# Patient Record
Sex: FEMALE
[De-identification: names, ages, dates, MRNs, and addresses within clinical notes are randomized per-mention and may not be internally consistent; named-entity substitution may affect disease eponyms.]

---

## 2017-01-04 ENCOUNTER — APPOINTMENT (OUTPATIENT)
Dept: ORTHOPEDIC SURGERY | Facility: CLINIC | Age: 37
End: 2017-01-04

## 2017-01-17 ENCOUNTER — APPOINTMENT (OUTPATIENT)
Dept: ORTHOPEDIC SURGERY | Facility: CLINIC | Age: 37
End: 2017-01-17

## 2017-02-13 ENCOUNTER — CLINICAL ADVICE (OUTPATIENT)
Age: 37
End: 2017-02-13

## 2020-09-17 ENCOUNTER — APPOINTMENT (OUTPATIENT)
Dept: ORTHOPEDIC SURGERY | Facility: CLINIC | Age: 40
End: 2020-09-17
Payer: COMMERCIAL

## 2020-09-17 VITALS
HEART RATE: 53 BPM | BODY MASS INDEX: 18.4 KG/M2 | HEIGHT: 62 IN | SYSTOLIC BLOOD PRESSURE: 131 MMHG | WEIGHT: 100 LBS | DIASTOLIC BLOOD PRESSURE: 84 MMHG

## 2020-09-17 DIAGNOSIS — Z86.32 PERSONAL HISTORY OF GESTATIONAL DIABETES: ICD-10-CM

## 2020-09-17 DIAGNOSIS — Z86.59 PERSONAL HISTORY OF OTHER MENTAL AND BEHAVIORAL DISORDERS: ICD-10-CM

## 2020-09-17 DIAGNOSIS — M25.552 PAIN IN LEFT HIP: ICD-10-CM

## 2020-09-17 DIAGNOSIS — M25.551 PAIN IN RIGHT HIP: ICD-10-CM

## 2020-09-17 DIAGNOSIS — Z87.891 PERSONAL HISTORY OF NICOTINE DEPENDENCE: ICD-10-CM

## 2020-09-17 DIAGNOSIS — M25.552 PAIN IN RIGHT HIP: ICD-10-CM

## 2020-09-17 DIAGNOSIS — Z78.9 OTHER SPECIFIED HEALTH STATUS: ICD-10-CM

## 2020-09-17 PROCEDURE — 99203 OFFICE O/P NEW LOW 30 MIN: CPT

## 2020-09-17 PROCEDURE — 73521 X-RAY EXAM HIPS BI 2 VIEWS: CPT

## 2020-09-17 NOTE — ASSESSMENT
[FreeTextEntry1] : 40-year-old woman comes in with ongoing recurrent pain along the superior iliac crest and just below it around the gluteal origin consistent with insertional tendinitis.It's more on the LEFT than RIGHT. It is similar in location to when I saw her 3-4 years ago.. It had responded temporarily to the steroid injection in that area.\par I referred her for therapy again. Heat and ice to the affected area. She is to work on posture. She has a scoliosis in the asymmetry may make her more prone to the condition. 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 She was wondering if she could have a labral tear but her pain is really not in the Groin or hip joint area.\par I did refer her for an MRI of the pelvis to evaluate further. I will call her with the results. Followup in 6-8 weeks. If pain is ongoing we may try an injection again assuming the MRI didn't show any alternate etiology or pathology.

## 2020-09-17 NOTE — PHYSICAL EXAM
[LE] : Sensory: Intact in bilateral lower extremities [DP] : dorsalis pedis 2+ and symmetric bilaterally [PT] : posterior tibial 2+ and symmetric bilaterally [Normal RLE] : Right Lower Extremity: No scars, rashes, lesions, ulcers, skin intact [Normal LLE] : Left Lower Extremity: No scars, rashes, lesions, ulcers, skin intact [Normal Touch] : sensation intact for touch [Normal] : Oriented to person, place, and time, insight and judgement were intact and the affect was normal [Obese] : not obese [de-identified] : bilateral hip exam\par Skin: Clean/dry and intact\par Inspection: No obvious deformity, no swelling. Some asymmetry in the spine consistent the scoliosis and when lying supine the LEFT leg is longer and LEFT pelvis is higher\par Pulses: 2+ DP/PT pulses\par ROM:  150 flexion without pain. Internal rotation to 40°. External rotation to 65°. Hypermobile hips and no pain with range of motion in the groin but she does have some lateral LEFT pelvic pain. No pain on the RIGHT.\par Painful ROM: None\par Tenderness: No tenderness over greater trochanter/glut medius insertion. No groin pain. Tender just posterior and inferior to the  ASIS/Illiac crest LEFT side.\par Strength: 5/5 ADD/ABD/Q/H/TA/GS/EHL\par Neuro: Sensation intact to light touch throughout, DTR normal\par Additional tests: Negative impingement test\par \par  [de-identified] : No respiratory distress or cough [de-identified] : \par AP pelvic x-ray and lateral x-rays of the hips are unremarkable. No abnormality in the ilium

## 2020-09-17 NOTE — HISTORY OF PRESENT ILLNESS
[de-identified] : Ms. Cat is now 39 yo and comes in for bilateral hip pain . She was seen for left  hip pain over 3 1/2 yrs ago.\par Pain started in 2015 I had treated her and she had done some physical therapy and took anti-inflammatories and then we did an injection which was helpful for a long while. \par Pain is 3-5/10 and dull and intermittent. Pain is worse when she puts pressure on the area or certain positions. She had seen an osteopath which didn't help. The pain seemed to start after giving birth. There was no other injury. She hasn't been running or doing any other type of exercise.

## 2020-11-04 ENCOUNTER — APPOINTMENT (OUTPATIENT)
Dept: ORTHOPEDIC SURGERY | Facility: CLINIC | Age: 40
End: 2020-11-04
Payer: COMMERCIAL

## 2020-11-04 DIAGNOSIS — M43.9 DEFORMING DORSOPATHY, UNSPECIFIED: ICD-10-CM

## 2020-11-04 DIAGNOSIS — M76.892 OTHER SPECIFIED ENTHESOPATHIES OF LEFT LOWER LIMB, EXCLUDING FOOT: ICD-10-CM

## 2020-11-04 DIAGNOSIS — M89.8X8 OTHER SPECIFIED DISORDERS OF BONE, OTHER SITE: ICD-10-CM

## 2020-11-04 DIAGNOSIS — M70.72 OTHER BURSITIS OF HIP, LEFT HIP: ICD-10-CM

## 2020-11-04 PROCEDURE — 99213 OFFICE O/P EST LOW 20 MIN: CPT | Mod: 25

## 2020-11-04 PROCEDURE — 99072 ADDL SUPL MATRL&STAF TM PHE: CPT

## 2020-11-04 PROCEDURE — 20610 DRAIN/INJ JOINT/BURSA W/O US: CPT | Mod: LT

## 2020-11-04 NOTE — PROCEDURE
[Injection] : Injection [Left] : of the left [Inflammation] : Inflammation [Diagnostic] : Diagnostic [Patient] : patient [Risk] : risk [Benefits] : benefits [Alternatives] : alternatives [Bleeding] : bleeding [Infection] : infection [Allergic Reaction] : allergic reaction [Verbal Consent Obtained] : verbal consent was obtained prior to the procedure [Alcohol] : Alcohol [Betadine] : Betadine [Ethyl Chloride Spray] : ethyl chloride spray was used as a topical anesthetic [Direct] : direct [25] : a 25-gauge [1% Lidocaine___(mL)] : [unfilled] mL of 1% Lidocaine [Triamcinolone 40mg/mL___(mL)] : [unfilled] ~UmL of 40mg/mL triamcinolone [Bandage Applied] : a bandage [Tolerated Well] : The patient tolerated the procedure well [None] : none [No Strenuous Activity___day(s)] : avoid strenuous activity for [unfilled] day(s) [PRN] : as needed [de-identified] : Posterior to ASIS/ site of tenderness  [de-identified] : Numbness in the area afterwards possibly with some pain relief when walking

## 2020-11-04 NOTE — ASSESSMENT
[FreeTextEntry1] : 40-year-old woman comes in with ongoing recurrent pain along the superior iliac crest.\par Her MRI just showed some mild trochanteric bursitis but there is no tenderness in this area. Her pain is more proximal more towards the gluteal origin posterior to anterior superior iliac crest. Given the chronic ongoing pain steroid injection was performed at this site which hopefully will give her some relief. She can apply some heat and ice.naproxen or she may want to try Voltaren gel a few times a day massaging it in. She could try something alternatives like acupuncture if pain is persisting. She should do the stretching and strengthening exercises she learned in physical therapy.\par followup as needed depending on hallux feels and if she moves

## 2020-11-04 NOTE — PHYSICAL EXAM
[LE] : Sensory: Intact in bilateral lower extremities [DP] : dorsalis pedis 2+ and symmetric bilaterally [PT] : posterior tibial 2+ and symmetric bilaterally [Normal RLE] : Right Lower Extremity: No scars, rashes, lesions, ulcers, skin intact [Normal LLE] : Left Lower Extremity: No scars, rashes, lesions, ulcers, skin intact [Normal Touch] : sensation intact for touch [Normal] : Oriented to person, place, and time, insight and judgement were intact and the affect was normal [Obese] : not obese [de-identified] : Bilateral hip exam\par Skin: Clean/dry and intact\par Inspection: No obvious deformity, no swelling. Some asymmetry in the spine consistent the scoliosis and when lying supine the LEFT leg is longer and LEFT pelvis is higher\par Pulses: 2+ DP/PT pulses\par ROM:  150 flexion without pain. Internal rotation to 40°. External rotation to 65°. Hypermobile hips and no pain with range of motion in the groin but she does have some lateral LEFT pelvic pain. No pain on the RIGHT.\par Tenderness: No tenderness over greater trochanter/glut medius insertion. No groin pain. Tender just posterior and inferior to the  ASIS/Illiac crest LEFT side. There is a slight crepitus in this region.\par Strength: 5/5 ADD/ABD/Q/H/TA/GS/EHL\par Neuro: Sensation intact to light touch throughout, DTR normal\par Additional tests: Negative impingement test\par \par  [de-identified] : No respiratory distress or cough [de-identified] : \par MRI Pelvis performed October 1, 2002 when he was unremarkable aside from mild LEFT greater trochanteric bursitis

## 2020-11-04 NOTE — HISTORY OF PRESENT ILLNESS
[de-identified] : Ms. Cat Present for ongoing pain in the LEFT lateral to posterior superior pelvic area near the proximal gluteal tendons just behind the anterior superior iliac Spine and crest.\par Pain started in 2015 I had treated her and she had done some physical therapy and took anti-inflammatories and then we did an injection which was helpful for a long while. \par Pain recurred and is now about a 3-4/10 intermittently and worse with walking and moving and better at rest.She has gone to some physical therapy but didn't find it very helpful. She did a little bit of the exercises on her own but not consistently. She was hoping to get more hands-on treatment with the physical therapy which she felt was lacking. She may be moving to [facility] in the next couple months.\par She had the MRI done which was unremarkable except for some very mild trochanteric bursitis on the LEFT side. Her pain is more proximal than the greater trochanter however. Sometimes she is a little bit of this pain on the RIGHT side.